# Patient Record
Sex: FEMALE | Race: WHITE | NOT HISPANIC OR LATINO | Employment: UNEMPLOYED | URBAN - METROPOLITAN AREA
[De-identification: names, ages, dates, MRNs, and addresses within clinical notes are randomized per-mention and may not be internally consistent; named-entity substitution may affect disease eponyms.]

---

## 2020-03-10 LAB
ABO GROUP BLD: NORMAL
HBV SURFACE AG SERPL QL IA: NEGATIVE
HIV 1+2 AB+HIV1 P24 AG SERPL QL IA: NORMAL
Lab: POSITIVE
RH BLD: POSITIVE
RUBV IGG SERPL IA-ACNC: NORMAL
TREPONEMA PALLIDUM IGG+IGM AB [PRESENCE] IN SERUM OR PLASMA BY IMMUNOASSAY: NORMAL

## 2020-05-21 ENCOUNTER — APPOINTMENT (OUTPATIENT)
Dept: RADIOLOGY | Facility: MEDICAL CENTER | Age: 20
End: 2020-05-21
Attending: EMERGENCY MEDICINE
Payer: MEDICAID

## 2020-05-21 ENCOUNTER — HOSPITAL ENCOUNTER (EMERGENCY)
Facility: MEDICAL CENTER | Age: 20
End: 2020-05-22
Attending: EMERGENCY MEDICINE
Payer: MEDICAID

## 2020-05-21 DIAGNOSIS — Z3A.18 18 WEEKS GESTATION OF PREGNANCY: ICD-10-CM

## 2020-05-21 DIAGNOSIS — S39.012A LOW BACK STRAIN, INITIAL ENCOUNTER: ICD-10-CM

## 2020-05-21 PROCEDURE — 700102 HCHG RX REV CODE 250 W/ 637 OVERRIDE(OP): Performed by: EMERGENCY MEDICINE

## 2020-05-21 PROCEDURE — 99283 EMERGENCY DEPT VISIT LOW MDM: CPT

## 2020-05-21 PROCEDURE — A9270 NON-COVERED ITEM OR SERVICE: HCPCS | Performed by: EMERGENCY MEDICINE

## 2020-05-21 RX ORDER — DIAZEPAM 5 MG/1
5 TABLET ORAL ONCE
Status: COMPLETED | OUTPATIENT
Start: 2020-05-21 | End: 2020-05-21

## 2020-05-21 RX ORDER — ACETAMINOPHEN 500 MG
1000 TABLET ORAL ONCE
Status: COMPLETED | OUTPATIENT
Start: 2020-05-21 | End: 2020-05-21

## 2020-05-21 RX ADMIN — DIAZEPAM 5 MG: 5 TABLET ORAL at 22:32

## 2020-05-21 RX ADMIN — ACETAMINOPHEN 1000 MG: 500 TABLET ORAL at 22:32

## 2020-05-22 VITALS
WEIGHT: 189.6 LBS | TEMPERATURE: 98.7 F | BODY MASS INDEX: 32.37 KG/M2 | HEART RATE: 84 BPM | OXYGEN SATURATION: 98 % | RESPIRATION RATE: 18 BRPM | DIASTOLIC BLOOD PRESSURE: 60 MMHG | SYSTOLIC BLOOD PRESSURE: 99 MMHG | HEIGHT: 64 IN

## 2020-05-22 PROCEDURE — 72148 MRI LUMBAR SPINE W/O DYE: CPT

## 2020-05-22 NOTE — DISCHARGE INSTRUCTIONS
DO not take NSAIDS for your back pain, but tylenol will be helpful  Return for any new or worsening issues

## 2020-05-22 NOTE — ED PROVIDER NOTES
ED Provider Note    CHIEF COMPLAINT  Chief Complaint   Patient presents with   • Pregnancy     18 week pregnany,    • Low Back Pain     Reaching motion while sitting resulting in shooting back pain less than one hour ago, experinces weakness in legs during even       HPI  Marlene Mejia is a 19 y.o. female who presents to the emergency department chief complaint of acute onset low back pain radiating down to both legs.  Patient is approximately 18 weeks pregnant she is a G1, P0 she follows up with Dr. Ruiz with OB/GYN.  She states that she fell heart a few years ago and she has been having low back pain on and off since then but this evening she reached forward while folding some laundry and then sat up and felt acute pain shooting down her back into her legs.  She states her legs were tingling and felt a little weak during that time.  She has not taken anything for the pain she feeling little nauseated due to the pain.  She denies any vaginal bleeding gush of fluid and she still feeling the fetus move slightly.    REVIEW OF SYSTEMS  Positives as above. Pertinent negatives include vomiting fevers chills vaginal bleeding discharge or fluid leakage dysuria hematuria numbness  All other review of systems are negative    PAST MEDICAL HISTORY   has a past medical history of Asthma.    SOCIAL HISTORY  Social History     Tobacco Use   • Smoking status: Never Smoker   Substance and Sexual Activity   • Alcohol use: No   • Drug use: No   • Sexual activity: Not on file       SURGICAL HISTORY  patient denies any surgical history    CURRENT MEDICATIONS  Home Medications     Reviewed by Billy Keane R.N. (Registered Nurse) on 20 at 2202  Med List Status: Partial   Medication Last Dose Status   cimetidine (TAGAMET) 400 MG TABS  Active   diphenhydrAMINE (BENADRYL) 25 MG capsule  Active                ALLERGIES  Allergies   Allergen Reactions   • Pollen Extract        PHYSICAL EXAM  VITAL SIGNS: /92   Pulse (!)  "119   Temp 36.9 °C (98.5 °F)   Resp 18   Ht 1.626 m (5' 4\")   Wt 86 kg (189 lb 9.5 oz)   SpO2 96%   BMI 32.54 kg/m²    Pulse ox interpretation: I interpret this pulse ox as normal.  Constitutional: Alert mildly stressed  HENT: Normocephalic, Atraumatic, MMM  Eyes: PERound. Conjunctiva normal, non-icteric.   Heart: Regular rate and rhythm, no murmurs.    Lungs: Clear to auscultation bilaterally. No resp distress, breath sounds equal  Abdomen: Non-tender, non-distended, normal bowel sounds uterine fundus felt around the area of the umbilicus  Back: Mild midline L-spine and both SI joint tenderness without stepping as well as bilateral paraspinal muscular tenderness no swelling or step-offs, and the patient has a steady even gait though she is hunched over but strength in bilateral lower extremities 5 out of 5 sensation intact light touch distally.  Skin: Warm, Dry, No erythema, No rash.   Neurologic: Alert and oriented, Grossly non-focal.       DIFFERENTIAL DIAGNOSIS AND WORK UP PLAN    This is a 19 y.o. female who presents with cute low back strain however she states that she is having new worsening symptoms is had prior back pain before and states she is having some neurologic symptoms in the legs although she has no red flags such as IV drug use fevers or bowel or bladder issues she is pregnant and complaining of some weakness and tingling in the legs, I suspect this is most likely sciatica with some low back strain and sprain, I had a long discussion about medication management at this time including Tylenol versus opioids or benzos for the muscle relaxant at this time she is opted for single dose of a benzodiazepine to help with muscle relaxant plus oral Tylenol prior to an MRI.  We also consented for the MRI at this time.    Pertinent Lab Findings  Labs Reviewed - No data to display    Radiology  MR-LUMBAR SPINE-W/O    (Results Pending)   Mild disc disease at L5/S1 otherwise unremarkable     The " "radiologist's interpretation of all radiological studies have been reviewed by me.    COURSE & MEDICAL DECISION MAKING  Pertinent Labs & Imaging studies reviewed. (See chart for details)    12:48 AM  Reassessed patient the bedside she is doing better after the Valium as well as the Tylenol resting a bit more comfortably and actually sitting up or pending the prelim results of her MRI but otherwise she is well-appearing and feels comfortable potentially going home.    1:21 AM  At this time I spoke with Dr Leary with radiology her MRI is completely unremarkable beyond some mild disc disease at L5-S1 otherwise completely normal.    1:23 AM  I discussed the patient with her MRI findings we discussed a strain of her back only that she is going to be just fine she needs to Rice Tylenol stretching and follow-up with OB/GYN.  She understands feels comfortable in home    /57   Pulse 85   Temp 36.9 °C (98.5 °F)   Resp 18   Ht 1.626 m (5' 4\")   Wt 86 kg (189 lb 9.5 oz)   SpO2 96%   BMI 32.54 kg/m²       The patient will return for new or worsening symptoms and is stable at the time of discharge.    The patient is referred to a primary physician for blood pressure management, diabetic screening, and for all other preventative health concerns.    DISPOSITION:  Patient will be discharged home in stable condition.    FOLLOW UP:  Angie Ruiz M.D.  [address]  [facility] [id number] [phone]    Schedule an appointment as soon as possible for a visit       [facility], Emergency Dept  [address] [address] [phone]    If symptoms worsen      OUTPATIENT MEDICATIONS:  New Prescriptions    No medications on file           FINAL IMPRESSION  1. Low back strain, initial encounter     2. 18 weeks gestation of pregnancy         Electronically signed by: Megha Baird M.D., 5/21/2020 10:20 PM    This dictation has been created using voice recognition " software and/or scribes. The accuracy of the dictation is limited by the abilities of the software and the expertise of the scribes. I expect there may be some errors of grammar and possibly content. I made every attempt to manually correct the errors within my dictation. However, errors related to voice recognition software and/or scribes may still exist and should be interpreted within the appropriate context.

## 2020-05-22 NOTE — ED TRIAGE NOTES
Chief Complaint   Patient presents with   • Pregnancy     18 week pregnany,    • Low Back Pain     Reaching motion while sitting resulting in shooting back pain less than one hour ago, experinces weakness in legs during even

## 2020-08-28 ENCOUNTER — HOSPITAL ENCOUNTER (OUTPATIENT)
Facility: MEDICAL CENTER | Age: 20
End: 2020-08-28
Attending: OBSTETRICS & GYNECOLOGY | Admitting: OBSTETRICS & GYNECOLOGY
Payer: MEDICAID

## 2020-08-28 VITALS
HEIGHT: 64 IN | TEMPERATURE: 97.8 F | BODY MASS INDEX: 32.44 KG/M2 | SYSTOLIC BLOOD PRESSURE: 123 MMHG | WEIGHT: 190 LBS | DIASTOLIC BLOOD PRESSURE: 78 MMHG | OXYGEN SATURATION: 96 % | HEART RATE: 85 BPM | RESPIRATION RATE: 16 BRPM

## 2020-08-28 LAB
APPEARANCE UR: CLEAR
COLOR UR AUTO: YELLOW
FIBRONECTIN FETAL SPEC QL: NEGATIVE
GLUCOSE UR QL STRIP.AUTO: 100 MG/DL
KETONES UR QL STRIP.AUTO: ABNORMAL MG/DL
LEUKOCYTE ESTERASE UR QL STRIP.AUTO: ABNORMAL
NITRITE UR QL STRIP.AUTO: POSITIVE
PH UR STRIP.AUTO: 7 [PH] (ref 5–8)
PROT UR QL STRIP: ABNORMAL MG/DL
RBC UR QL AUTO: NEGATIVE
SP GR UR STRIP.AUTO: 1.01 (ref 1–1.03)

## 2020-08-28 PROCEDURE — 59025 FETAL NON-STRESS TEST: CPT

## 2020-08-28 PROCEDURE — 700111 HCHG RX REV CODE 636 W/ 250 OVERRIDE (IP): Performed by: OBSTETRICS & GYNECOLOGY

## 2020-08-28 PROCEDURE — 81002 URINALYSIS NONAUTO W/O SCOPE: CPT

## 2020-08-28 PROCEDURE — 82731 ASSAY OF FETAL FIBRONECTIN: CPT

## 2020-08-28 PROCEDURE — 96372 THER/PROPH/DIAG INJ SC/IM: CPT

## 2020-08-28 PROCEDURE — 87086 URINE CULTURE/COLONY COUNT: CPT

## 2020-08-28 RX ORDER — NITROFURANTOIN 25; 75 MG/1; MG/1
100 CAPSULE ORAL 2 TIMES DAILY WITH MEALS
Status: DISCONTINUED | OUTPATIENT
Start: 2020-08-29 | End: 2020-08-29 | Stop reason: HOSPADM

## 2020-08-28 RX ORDER — TERBUTALINE SULFATE 1 MG/ML
INJECTION, SOLUTION SUBCUTANEOUS
Status: DISCONTINUED
Start: 2020-08-28 | End: 2020-08-28

## 2020-08-28 RX ORDER — NITROFURANTOIN 25; 75 MG/1; MG/1
100 CAPSULE ORAL 2 TIMES DAILY
Qty: 14 CAP | Refills: 0 | Status: SHIPPED | OUTPATIENT
Start: 2020-08-28 | End: 2020-09-04

## 2020-08-28 RX ORDER — TERBUTALINE SULFATE 1 MG/ML
0.25 INJECTION, SOLUTION SUBCUTANEOUS ONCE
Status: COMPLETED | OUTPATIENT
Start: 2020-08-28 | End: 2020-08-28

## 2020-08-28 RX ADMIN — TERBUTALINE SULFATE 0.25 MG: 1 INJECTION SUBCUTANEOUS at 21:53

## 2020-08-29 NOTE — PROGRESS NOTES
Patient arrived to unit and placed in LDA 1, RN placed EFM and toco to ensure fetal well being and monitor uterine activity. RN educated patient on need for monitors and patient verbalized understanding. Vital signs taken and within normal limits. Prenatal labs / records reviewed by RN. Patient's chief complaint contractions.  Patient denies leaking or vaginal bleeding, reports positive fetal movement.    Dr. Cotton notified of patient arrival, brief report given,  & para reviewed, bps, fhr, contraction pattern reviewed, MD will be back on unit in a little bit to assess patient.     Dr. Cotton at bedside, discussing plan of care.    FFN collected. Terb order, urine culture ordered. SVE closed.    Dr. Cotton notified FFN negative, d/c order given, Macrobid script written by Dr. Cotton.   RN at bedside, discharge instructions given to patient and significant other, all questions answered. Patient discharged in stable condition, with significant other,  given labor precautions. Patient ambulated to private car with significant other. Macrobid prescript given and discharge instructions given as follows:   General Instructions:  · If you think you are in labor, time contractions (lying on your left side) from the beginning of one contraction to the beginning of the next contraction for at least one hour.  · Increase fluid intake: you should consume 10-12 8 oz glasses of non-caffeinated fluid per day.  · Report any pressure or burning on urination to your physician.  · Monitor fetal movement: If you notice an absence or decrease in fetal movement, drink a large glass of water and rest on your side.  If there is no increase in movement, call your physician or go to the hospital for further evaluation.  · Report any sudden, sharp abdominal pain.  · Report any bleeding.  Spotting or pinkish discharge is normal after vaginal exam.  You may also spot after sexual intercourse.    Pre-term Labor (<37  weeks):  Call your physician or return to the hospital if:  · You have painless regular contractions more than 4 in one hour.  · Your water breaks (remember time and color).  · You have menstrual-like cramps, a low dull backache or pressure in your pelvis or back.  · Your baby does not move enough to complete the daily kick count (10 movements in 2 hours).  · Your baby moves much less often than on the days before or you have not felt your baby move all day.  · Please review the MEDICATION LIST section of your AFTER VISIT SUMMARY document.  · Take your medication as prescribed    All questions answered and patient knows to keep her scheduled appointment with her OB.

## 2020-08-29 NOTE — CONSULTS
DATE OF SERVICE:  2020    HISTORY OF PRESENT ILLNESS:  Patient is a private patient of Dr. Angie Ruiz's, 19-year-old  1, para 0 at 32 and 5/7th weeks' gestation,   presents to labor and delivery complaining of contractions.  She is afebrile.    Vitals are stable.  She was placed on fetal nonstress test and found in fact   to be joel frequently.  Fetal nonstress test is category 1, reactive,   reassuring, good variability.  Her prenatal course has been complicated by   history of herpes simplex 2 outbreaks as well as a Molluscum contagiosum   outbreak.  She has otherwise had an uneventful prenatal course other than   that.  Fetal fibronectin was checked, it was returned negative.  Her cervix   was checked, she was found to be closed, thick and high.  Her urinalysis did   reveal positive leukocyte esterase and nitrites and trace ketones and trace   protein.  It will be sent for culture.  She was given 1 dose of terbutaline,   which did quiesce her contractions.    ASSESSMENT AND PLAN:  A 19-year-old female  1, para 0 at 32 and 5/7th   weeks' gestation.  1.   contractions, no evidence of  labor.  Fetal fibronectin was   negative.  2.  Urinary tract infection.  We will start her on Macrobid 100 mg 1 p.o.   b.i.d. x7 days.  Urine culture will be sent.  She can follow that up in the   office with Dr. Ruiz later this week.  Encouraged increasing fluids and   hydration.  3.  Molluscum contagiosum outbreak.  Patient has an appointment with Dr. Ruiz later this week to further discuss treatment for it.  4.  History of herpes simplex 2, but no evidence of any active herpes   outbreaks.       ____________________________________     MD FERN HADDAD / ZOYA    DD:  2020 23:25:30  DT:  2020 23:50:25    D#:  [id number]  Job#:  [id number]

## 2020-08-31 LAB
BACTERIA UR CULT: NORMAL
SIGNIFICANT IND 70042: NORMAL
SITE SITE: NORMAL
SOURCE SOURCE: NORMAL

## 2020-09-16 ENCOUNTER — HOSPITAL ENCOUNTER (OUTPATIENT)
Facility: MEDICAL CENTER | Age: 20
End: 2020-09-16
Attending: OBSTETRICS & GYNECOLOGY | Admitting: OBSTETRICS & GYNECOLOGY
Payer: MEDICAID

## 2020-09-16 VITALS
DIASTOLIC BLOOD PRESSURE: 67 MMHG | HEART RATE: 85 BPM | SYSTOLIC BLOOD PRESSURE: 116 MMHG | TEMPERATURE: 98.7 F | RESPIRATION RATE: 18 BRPM | OXYGEN SATURATION: 97 %

## 2020-09-16 LAB
ALBUMIN SERPL BCP-MCNC: 3.3 G/DL (ref 3.2–4.9)
ALBUMIN/GLOB SERPL: 1.2 G/DL
ALP SERPL-CCNC: 121 U/L (ref 30–99)
ALT SERPL-CCNC: 8 U/L (ref 2–50)
ANION GAP SERPL CALC-SCNC: 14 MMOL/L (ref 7–16)
APPEARANCE UR: CLEAR
AST SERPL-CCNC: 8 U/L (ref 12–45)
BASOPHILS # BLD AUTO: 0.4 % (ref 0–1.8)
BASOPHILS # BLD: 0.03 K/UL (ref 0–0.12)
BILIRUB SERPL-MCNC: 0.2 MG/DL (ref 0.1–1.5)
BUN SERPL-MCNC: 3 MG/DL (ref 8–22)
CALCIUM SERPL-MCNC: 8.8 MG/DL (ref 8.5–10.5)
CHLORIDE SERPL-SCNC: 103 MMOL/L (ref 96–112)
CO2 SERPL-SCNC: 21 MMOL/L (ref 20–33)
COLOR UR AUTO: YELLOW
CREAT SERPL-MCNC: 0.35 MG/DL (ref 0.5–1.4)
EOSINOPHIL # BLD AUTO: 0.14 K/UL (ref 0–0.51)
EOSINOPHIL NFR BLD: 1.6 % (ref 0–6.9)
ERYTHROCYTE [DISTWIDTH] IN BLOOD BY AUTOMATED COUNT: 42.5 FL (ref 35.9–50)
GLOBULIN SER CALC-MCNC: 2.8 G/DL (ref 1.9–3.5)
GLUCOSE SERPL-MCNC: 77 MG/DL (ref 65–99)
GLUCOSE UR QL STRIP.AUTO: NEGATIVE MG/DL
HCT VFR BLD AUTO: 35.3 % (ref 37–47)
HGB BLD-MCNC: 11.6 G/DL (ref 12–16)
IMM GRANULOCYTES # BLD AUTO: 0.04 K/UL (ref 0–0.11)
IMM GRANULOCYTES NFR BLD AUTO: 0.5 % (ref 0–0.9)
KETONES UR QL STRIP.AUTO: NEGATIVE MG/DL
LEUKOCYTE ESTERASE UR QL STRIP.AUTO: NEGATIVE
LYMPHOCYTES # BLD AUTO: 1.95 K/UL (ref 1–4.8)
LYMPHOCYTES NFR BLD: 22.9 % (ref 22–41)
MCH RBC QN AUTO: 29.3 PG (ref 27–33)
MCHC RBC AUTO-ENTMCNC: 32.9 G/DL (ref 33.6–35)
MCV RBC AUTO: 89.1 FL (ref 81.4–97.8)
MONOCYTES # BLD AUTO: 0.59 K/UL (ref 0–0.85)
MONOCYTES NFR BLD AUTO: 6.9 % (ref 0–13.4)
NEUTROPHILS # BLD AUTO: 5.78 K/UL (ref 2–7.15)
NEUTROPHILS NFR BLD: 67.7 % (ref 44–72)
NITRITE UR QL STRIP.AUTO: NEGATIVE
NRBC # BLD AUTO: 0 K/UL
NRBC BLD-RTO: 0 /100 WBC
PH UR STRIP.AUTO: 7 [PH] (ref 5–8)
PLATELET # BLD AUTO: 249 K/UL (ref 164–446)
PMV BLD AUTO: 9.5 FL (ref 9–12.9)
POTASSIUM SERPL-SCNC: 3.7 MMOL/L (ref 3.6–5.5)
PROT SERPL-MCNC: 6.1 G/DL (ref 6–8.2)
PROT UR QL STRIP: NEGATIVE MG/DL
RBC # BLD AUTO: 3.96 M/UL (ref 4.2–5.4)
RBC UR QL AUTO: NEGATIVE
SODIUM SERPL-SCNC: 138 MMOL/L (ref 135–145)
SP GR UR STRIP.AUTO: 1.01 (ref 1–1.03)
WBC # BLD AUTO: 8.5 K/UL (ref 4.8–10.8)

## 2020-09-16 PROCEDURE — 83789 MASS SPECTROMETRY QUAL/QUAN: CPT

## 2020-09-16 PROCEDURE — 80053 COMPREHEN METABOLIC PANEL: CPT

## 2020-09-16 PROCEDURE — 59025 FETAL NON-STRESS TEST: CPT

## 2020-09-16 PROCEDURE — 36415 COLL VENOUS BLD VENIPUNCTURE: CPT

## 2020-09-16 PROCEDURE — 85025 COMPLETE CBC W/AUTO DIFF WBC: CPT

## 2020-09-16 PROCEDURE — 81002 URINALYSIS NONAUTO W/O SCOPE: CPT

## 2020-09-16 NOTE — PROGRESS NOTES
PT arrived with CO itching all over her body, she stated her friend had cholestasis and she is concerned that she has it as well.  She also CO N/V due to heartburn. She has generalized discomforts of pregnancy.     Dr Cotton in department, okay to orders labs and offer GI Cocktail    1530 RN at , PT appears generally uncomfortable. She stated she can eat and drink sometimes.  I offered GI cocktail but PT refused.     1900 Report to Lindsey.

## 2020-09-17 NOTE — PROGRESS NOTES
1900 Report received from ALMA ROSA Jasmine RN.     1945 Dr. Cotton updated on labs. Discharge orders received.     2005 Discharge instructions given. Pt states understanding and denies questions. Discharged home.

## 2020-09-17 NOTE — DISCHARGE INSTRUCTIONS
General Instructions:  · If you think you are in labor, time contractions (lying on your left side) from the beginning of one contraction to the beginning of the next contraction for at least one hour.  · Increase fluid intake: you should consume 10-12 8 oz glasses of non-caffeinated fluid per day.  · Report any pressure or burning on urination to your physician.  · Monitor fetal movement: If you notice an absence or decrease in fetal movement, drink a large glass of water and rest on your side.  If there is no increase in movement, call your physician or go to the hospital for further evaluation.  · Report any sudden, sharp abdominal pain.  · Report any bleeding.  Spotting or pinkish discharge is normal after vaginal exam.  You may also spot after sexual intercourse.    Pre-term Labor (<37 weeks):  Call your physician or return to the hospital if:  · You have painless regular contractions more than 4 in one hour.  · Your water breaks (remember time and color).  · You have menstrual-like cramps, a low dull backache or pressure in your pelvis or back.  · Your baby does not move enough to complete the daily kick count (10 movements in 2 hours).  · Your baby moves much less often than on the days before or you have not felt your baby move all day.  · Please review the MEDICATION LIST section of your AFTER VISIT SUMMARY document.  · Take your medication as prescribed      Other Instructions:  Take Benadryl or other allergy medicine such as Allegra as needed for itching.  Follow up with your doctor regarding Bile Acid labs, which may take up to a week to result.

## 2020-09-17 NOTE — CONSULTS
DATE OF SERVICE:  2020    HISTORY OF PRESENT ILLNESS:  Patient is a 19-year-old female  1, para 0   at 35 and 3/7 weeks' gestation, presents to labor and delivery complaining of   itching all over her body.  She also states she has nausea, vomiting due to   heartburn and the generalized discomforts of pregnancy.  She stated she had a   friend who had cholestasis of pregnancy and she was concerned she had it as   well.  She reported good fetal movement, no leaking fluid, no vaginal   bleeding.  Fetal heart rate tracing was overall reassuring.  She denied any   rash and had not taken anything for her itching.    PHYSICAL EXAMINATION:  VITAL SIGNS:  Stable.  She is afebrile.  GENERAL:  Awake, alert, oriented.  She is in no acute distress.  CARDIOVASCULAR:  Regular rate and rhythm.  CHEST:  Clear to auscultation bilaterally.  ABDOMEN:  Gravid, nontender, nondistended.  Normal bowel sounds.  EXTREMITIES:  No cyanosis, clubbing or edema.  PELVIC:  Sterile vaginal exam was deferred.  Fetal heart rate tracing category   1, reactive, reassuring, good variability, no decelerations, no contractions.    LABORATORY DATA:  Labs were drawn.  Hemoglobin 11.6, hematocrit 35.3, platelet   count 249,000.  Chemistry panel was all essentially normal, normal   creatinine, normal liver function testing.  No protein on her urine dipstick   and bile acids are pending.    ASSESSMENT AND PLAN:  A 19-year-old female  1, para 0 at 35 and 3/7   weeks' gestation.  1.  At this point in time, no evidence of cholestasis despite her itching.    Bile acids have been sent in our send out lab and will be back next week.    Patient is aware of this and encouraged her to make sure her OB/GYN follows up   on them and the results next week.  2.  For her itching, she had not taken anything at all.  She may use Benadryl   or Claritin or Zyrtec as an antihistamine.  She also can do calamine lotion or   anti-itch lotion or spray if she needs  it.  3.  Acid reflux.  Patient was offered something stronger for her acid reflux   other than Tums that she is taking.  She reports it is causing nausea and   vomiting; however, she had no nausea, vomiting for us here today and she   declined anything further for her acid reflux.  Patient will follow up with   her doctor as scheduled.  Kick counts, labor warnings given.       ____________________________________     MD FERN HADDAD / ZOYA    DD:  09/16/2020 19:56:23  DT:  09/16/2020 23:37:19    D#:  [id number]  Job#:  [id number]

## 2020-09-18 ENCOUNTER — HOSPITAL ENCOUNTER (OUTPATIENT)
Facility: MEDICAL CENTER | Age: 20
End: 2020-09-18
Attending: OBSTETRICS & GYNECOLOGY | Admitting: OBSTETRICS & GYNECOLOGY
Payer: MEDICAID

## 2020-09-18 VITALS
HEIGHT: 64 IN | WEIGHT: 213 LBS | DIASTOLIC BLOOD PRESSURE: 82 MMHG | TEMPERATURE: 98 F | HEART RATE: 106 BPM | BODY MASS INDEX: 36.37 KG/M2 | SYSTOLIC BLOOD PRESSURE: 120 MMHG

## 2020-09-18 VITALS
RESPIRATION RATE: 16 BRPM | TEMPERATURE: 98.4 F | HEIGHT: 64 IN | DIASTOLIC BLOOD PRESSURE: 73 MMHG | WEIGHT: 213 LBS | OXYGEN SATURATION: 97 % | SYSTOLIC BLOOD PRESSURE: 116 MMHG | BODY MASS INDEX: 36.37 KG/M2 | HEART RATE: 107 BPM

## 2020-09-18 LAB
APPEARANCE UR: ABNORMAL
COLOR UR AUTO: ABNORMAL
GLUCOSE UR QL STRIP.AUTO: NEGATIVE MG/DL
KETONES UR QL STRIP.AUTO: NEGATIVE MG/DL
LEUKOCYTE ESTERASE UR QL STRIP.AUTO: NEGATIVE
NITRITE UR QL STRIP.AUTO: NEGATIVE
PH UR STRIP.AUTO: 6.5 [PH] (ref 5–8)
PROT UR QL STRIP: NEGATIVE MG/DL
RBC UR QL AUTO: ABNORMAL
SP GR UR STRIP.AUTO: 1.01 (ref 1–1.03)

## 2020-09-18 PROCEDURE — 87150 DNA/RNA AMPLIFIED PROBE: CPT

## 2020-09-18 PROCEDURE — 87081 CULTURE SCREEN ONLY: CPT

## 2020-09-18 PROCEDURE — 59025 FETAL NON-STRESS TEST: CPT

## 2020-09-18 PROCEDURE — 700111 HCHG RX REV CODE 636 W/ 250 OVERRIDE (IP): Performed by: OBSTETRICS & GYNECOLOGY

## 2020-09-18 PROCEDURE — 96372 THER/PROPH/DIAG INJ SC/IM: CPT

## 2020-09-18 PROCEDURE — 81002 URINALYSIS NONAUTO W/O SCOPE: CPT

## 2020-09-18 RX ORDER — MORPHINE SULFATE 4 MG/ML
5 INJECTION, SOLUTION INTRAMUSCULAR; INTRAVENOUS ONCE
Status: COMPLETED | OUTPATIENT
Start: 2020-09-18 | End: 2020-09-18

## 2020-09-18 RX ADMIN — MORPHINE SULFATE 5 MG: 4 INJECTION INTRAVENOUS at 15:41

## 2020-09-18 ASSESSMENT — FIBROSIS 4 INDEX
FIB4 SCORE: 0.24
FIB4 SCORE: 0.24

## 2020-09-18 NOTE — PROGRESS NOTES
"Progress Note    Subjective: 19-year-old G1 at 35 weeks and 5/7 days here today complaining of contractions that started last evening.  They have been progressively worsening throughout the day.  Patient reports no loss of fluid no vaginal bleeding good fetal movement.  Review of systems negative for fever nausea vomiting chest pain shortness of breath last taste or smell    Objective Data:  Recent Labs     20  1653   WBC 8.5   RBC 3.96*   HEMOGLOBIN 11.6*   HEMATOCRIT 35.3*   MCV 89.1   MCH 29.3   MCHC 32.9*   RDW 42.5   PLATELETCT 249   MPV 9.5     Recent Labs     20  1653   SODIUM 138   POTASSIUM 3.7   CHLORIDE 103   CO2 21   GLUCOSE 77   BUN 3*   CREATININE 0.35*   CALCIUM 8.8       Vitals:    20 1224   BP: 120/82   Pulse: (!) 106   Temp: 36.7 °C (98 °F)   TempSrc: Temporal   Weight: 96.6 kg (213 lb)   Height: 1.626 m (5' 4.02\")       Well-developed well-nourished female in moderate distress  Cervix was examined by nurse approximately 2 hours ago, 1 thick and high  Cervical exam now by myself unchanged    NST read by me fetal heart tones 140s reactive no decelerations  [place] shows irregular contractions    Current Facility-Administered Medications   Medication Dose Route Frequency Provider Last Rate Last Dose   • morphine (pf) 4 MG/ML injection 5 mg  5 mg Intramuscular Once Angie Ruiz M.D.           Assessment: IUP 35-5/7 weeks with  contractions but no evidence of  labor    Plan: IM morphine for therapeutic rest  GBS collected  "

## 2020-09-18 NOTE — PROGRESS NOTES
EDC  10/18/2020      EGA  35w5d    1221: TOCO/US applied, pt educated. Pt presents with UC's stating the contractions come every 5 minutes, and states contractions started last evening. Pt declines LOF, VB, and states +FM. Pt appears to be really uncomfortable, pt c/o of lower back pain and pressure in the vaginal area. Pt declines any complications during this pregnancy, but states she was screened for cholestasis 2 days ago, but is still pending results. POC discussed with patient, all questions and concerns addressed.     1249: SVE /thick/-2    1541: 5 mg IM morphine given to patient for pain, see MAR.     1555: Discharge orders received from Dr. Ruiz. T's tracing reviewed by Dr. Kramer.     1607: Discharge instructions gone over with patient and SO Brandon, all questions and concerns addressed. SO Brandon will be driving patient home.

## 2020-09-19 LAB — GP B STREP DNA SPEC QL NAA+PROBE: NEGATIVE

## 2020-09-19 NOTE — PROGRESS NOTES
Pt is a 20 yo  35w5d. Reports to L&D with report of decreased fetal movement. Pt was seen earlier today for abdominal pain/contractions which have since resolved. Pt denies VB, LOF, regular UC. NST reactive. Dr. Ruiz notified of pt arrival and assessment. Discharge orders received.     Discharge instructions given. Questions addressed. Pt discharged in stable condition.

## 2020-09-22 LAB
BILE AC SERPL-SCNC: 1.5 UMOL/L (ref 0–7)
CDCAE SERPL-SCNC: 0.4 UMOL/L (ref 0–3.4)
CHOLATE SERPL-SCNC: 0.3 UMOL/L (ref 0–1.9)
DO-CHOLATE SERPL-SCNC: 0.5 UMOL/L (ref 0–2.5)
URSODEOXYCHOLATE SERPL-SCNC: 0.3 UMOL/L (ref 0–1)

## 2020-10-08 ENCOUNTER — HOSPITAL ENCOUNTER (OUTPATIENT)
Dept: OBGYN | Facility: MEDICAL CENTER | Age: 20
End: 2020-10-08
Attending: OBSTETRICS & GYNECOLOGY
Payer: MEDICAID

## 2020-10-08 LAB
COVID ORDER STATUS COVID19: NORMAL
SARS-COV-2 RNA RESP QL NAA+PROBE: NOTDETECTED
SPECIMEN SOURCE: NORMAL

## 2020-10-08 PROCEDURE — U0003 INFECTIOUS AGENT DETECTION BY NUCLEIC ACID (DNA OR RNA); SEVERE ACUTE RESPIRATORY SYNDROME CORONAVIRUS 2 (SARS-COV-2) (CORONAVIRUS DISEASE [COVID-19]), AMPLIFIED PROBE TECHNIQUE, MAKING USE OF HIGH THROUGHPUT TECHNOLOGIES AS DESCRIBED BY CMS-2020-01-R: HCPCS

## 2020-10-08 PROCEDURE — C9803 HOPD COVID-19 SPEC COLLECT: HCPCS | Performed by: OBSTETRICS & GYNECOLOGY

## 2020-10-10 NOTE — H&P
DATE OF ADMISSION:  10/11/2020    HISTORY OF PRESENT ILLNESS:  This is a 20-year-old  whose estimated date   of delivery is 10/18 making her 39 weeks based on a first trimester   ultrasound.  The patient desires elective induction of labor.  She has been   having significant uncomfortable prodromal labor and contractions for the last   few weeks.  She also reports having itching, although she has been ruled out   for cholestasis of pregnancy 2 times.  She reports good fetal movement,   reports contractions.  No vaginal bleeding, no loss of fluid.    REVIEW OF SYSTEMS:  Negative for chest pain, shortness of breath, fever,   nausea, vomiting, cough, or loss of taste and smell.    MEDICAL HISTORY:  Asthma.    SURGICAL HISTORY:  None.    MEDICATIONS:  Patient is currently taking Aldara 5% cream for the last 4   weeks.    ALLERGIES:  No known drug allergies.    SOCIAL HISTORY:  She denies tobacco, ethanol or drugs.    PHYSICAL EXAMINATION:  GENERAL:  She is a well-developed, well-nourished female, in no apparent   distress.  VITAL SIGNS:  Blood pressure 104/72.  HEART:  Regular rate and rhythm.  LUNGS:  Clear to auscultation bilaterally.  ABDOMEN:  Soft and gravid.  EXTREMITIES:  Show trace edema.  CERVICAL EXAM:  1, 50%, -2 station, vertex presentation.    PERTINENT LABORATORY DATA:  Her blood type is A positive, antibody screen   negative.  Genetic testing first trimester sequential screen was negative, HIV   negative, RPR nonreactive, rubella immune, group B strep is negative.    ASSESSMENT AND PLAN:  A 20-year-old  1, para 0 at 39 weeks with desired   elective induction of labor.  Patient is counseled on risks and benefits.  He   will be admitted on 10/11 at 8:00 a.m. for induction.       ____________________________________     MD ALFRED Merchant / ZOYA    DD:  10/09/2020 17:13:43  DT:  10/09/2020 20:28:29    D#:  [id number]  Job#:  [id number]

## 2020-10-11 ENCOUNTER — APPOINTMENT (OUTPATIENT)
Dept: OBGYN | Facility: MEDICAL CENTER | Age: 20
End: 2020-10-11
Attending: OBSTETRICS & GYNECOLOGY
Payer: MEDICAID

## 2020-10-11 ENCOUNTER — HOSPITAL ENCOUNTER (INPATIENT)
Facility: MEDICAL CENTER | Age: 20
LOS: 3 days | End: 2020-10-14
Attending: OBSTETRICS & GYNECOLOGY | Admitting: OBSTETRICS & GYNECOLOGY
Payer: MEDICAID

## 2020-10-11 LAB
BASOPHILS # BLD AUTO: 0.3 % (ref 0–1.8)
BASOPHILS # BLD: 0.02 K/UL (ref 0–0.12)
EOSINOPHIL # BLD AUTO: 0.15 K/UL (ref 0–0.51)
EOSINOPHIL NFR BLD: 1.9 % (ref 0–6.9)
ERYTHROCYTE [DISTWIDTH] IN BLOOD BY AUTOMATED COUNT: 42.3 FL (ref 35.9–50)
HCT VFR BLD AUTO: 36.6 % (ref 37–47)
HGB BLD-MCNC: 12.3 G/DL (ref 12–16)
HOLDING TUBE BB 8507: NORMAL
IMM GRANULOCYTES # BLD AUTO: 0.03 K/UL (ref 0–0.11)
IMM GRANULOCYTES NFR BLD AUTO: 0.4 % (ref 0–0.9)
LYMPHOCYTES # BLD AUTO: 2.04 K/UL (ref 1–4.8)
LYMPHOCYTES NFR BLD: 26.1 % (ref 22–41)
MCH RBC QN AUTO: 29.1 PG (ref 27–33)
MCHC RBC AUTO-ENTMCNC: 33.6 G/DL (ref 33.6–35)
MCV RBC AUTO: 86.5 FL (ref 81.4–97.8)
MONOCYTES # BLD AUTO: 0.65 K/UL (ref 0–0.85)
MONOCYTES NFR BLD AUTO: 8.3 % (ref 0–13.4)
NEUTROPHILS # BLD AUTO: 4.92 K/UL (ref 2–7.15)
NEUTROPHILS NFR BLD: 63 % (ref 44–72)
NRBC # BLD AUTO: 0 K/UL
NRBC BLD-RTO: 0 /100 WBC
PLATELET # BLD AUTO: 285 K/UL (ref 164–446)
PMV BLD AUTO: 9.9 FL (ref 9–12.9)
RBC # BLD AUTO: 4.23 M/UL (ref 4.2–5.4)
WBC # BLD AUTO: 7.8 K/UL (ref 4.8–10.8)

## 2020-10-11 PROCEDURE — 700102 HCHG RX REV CODE 250 W/ 637 OVERRIDE(OP): Performed by: OBSTETRICS & GYNECOLOGY

## 2020-10-11 PROCEDURE — 770002 HCHG ROOM/CARE - OB PRIVATE (112)

## 2020-10-11 PROCEDURE — 36415 COLL VENOUS BLD VENIPUNCTURE: CPT

## 2020-10-11 PROCEDURE — A9270 NON-COVERED ITEM OR SERVICE: HCPCS | Performed by: OBSTETRICS & GYNECOLOGY

## 2020-10-11 PROCEDURE — 85025 COMPLETE CBC W/AUTO DIFF WBC: CPT

## 2020-10-11 RX ORDER — SODIUM CHLORIDE, SODIUM LACTATE, POTASSIUM CHLORIDE, CALCIUM CHLORIDE 600; 310; 30; 20 MG/100ML; MG/100ML; MG/100ML; MG/100ML
INJECTION, SOLUTION INTRAVENOUS CONTINUOUS
Status: ACTIVE | OUTPATIENT
Start: 2020-10-11 | End: 2020-10-11

## 2020-10-11 RX ORDER — MISOPROSTOL 200 UG/1
800 TABLET ORAL
Status: COMPLETED | OUTPATIENT
Start: 2020-10-11 | End: 2020-10-12

## 2020-10-11 RX ORDER — METHYLERGONOVINE MALEATE 0.2 MG/ML
0.2 INJECTION INTRAVENOUS
Status: COMPLETED | OUTPATIENT
Start: 2020-10-11 | End: 2020-10-12

## 2020-10-11 RX ADMIN — MISOPROSTOL 25 MCG: 100 TABLET ORAL at 15:35

## 2020-10-11 SDOH — ECONOMIC STABILITY: TRANSPORTATION INSECURITY
IN THE PAST 12 MONTHS, HAS LACK OF TRANSPORTATION KEPT YOU FROM MEETINGS, WORK, OR FROM GETTING THINGS NEEDED FOR DAILY LIVING?: PATIENT DECLINED

## 2020-10-11 SDOH — ECONOMIC STABILITY: FOOD INSECURITY: WITHIN THE PAST 12 MONTHS, YOU WORRIED THAT YOUR FOOD WOULD RUN OUT BEFORE YOU GOT MONEY TO BUY MORE.: PATIENT DECLINED

## 2020-10-11 SDOH — ECONOMIC STABILITY: TRANSPORTATION INSECURITY
IN THE PAST 12 MONTHS, HAS THE LACK OF TRANSPORTATION KEPT YOU FROM MEDICAL APPOINTMENTS OR FROM GETTING MEDICATIONS?: PATIENT DECLINED

## 2020-10-11 SDOH — ECONOMIC STABILITY: FOOD INSECURITY: WITHIN THE PAST 12 MONTHS, THE FOOD YOU BOUGHT JUST DIDN'T LAST AND YOU DIDN'T HAVE MONEY TO GET MORE.: PATIENT DECLINED

## 2020-10-11 ASSESSMENT — LIFESTYLE VARIABLES
EVER_SMOKED: NEVER
ALCOHOL_USE: NO

## 2020-10-11 ASSESSMENT — PATIENT HEALTH QUESTIONNAIRE - PHQ9
2. FEELING DOWN, DEPRESSED, IRRITABLE, OR HOPELESS: NOT AT ALL
SUM OF ALL RESPONSES TO PHQ9 QUESTIONS 1 AND 2: 0
1. LITTLE INTEREST OR PLEASURE IN DOING THINGS: NOT AT ALL
1. LITTLE INTEREST OR PLEASURE IN DOING THINGS: NOT AT ALL
2. FEELING DOWN, DEPRESSED, IRRITABLE, OR HOPELESS: NOT AT ALL
SUM OF ALL RESPONSES TO PHQ9 QUESTIONS 1 AND 2: 0

## 2020-10-11 ASSESSMENT — FIBROSIS 4 INDEX: FIB4 SCORE: 0.25

## 2020-10-11 NOTE — CARE PLAN
Problem: Pain  Goal: Alleviation of Pain or a reduction in pain to the patient's comfort goal  Outcome: PROGRESSING AS EXPECTED  Note: Discussed all pain management options with pt, desires an epidural when in active labor.     Problem: Risk for Infection, Impaired Wound Healing  Goal: Remain free from signs and symptoms of infection  Outcome: PROGRESSING AS EXPECTED  Note: Pt remains free from signs/symptoms of infection.

## 2020-10-11 NOTE — PROGRESS NOTES
EDC- 10/18, EGA- 39    1445- Pt arrived for scheduled elective IOL with FOB Luigi at side.  Pt denies UC's, LOF, or VB, reports +FM.  EFM/TOCO applied, VSS.  SVE- 1.5/50/-2.  IV started, labs drawn, POC discussed, all questions answered.  1515- Dr. Reyna at bs for bright light exam.  No active lesions per MD, POC for cytotec placement.  1535- Cytotec placed per MD orders.  190- Report to ZOILA Dockery RN.

## 2020-10-12 ENCOUNTER — ANESTHESIA EVENT (OUTPATIENT)
Dept: ANESTHESIOLOGY | Facility: MEDICAL CENTER | Age: 20
End: 2020-10-12
Payer: MEDICAID

## 2020-10-12 ENCOUNTER — ANESTHESIA (OUTPATIENT)
Dept: ANESTHESIOLOGY | Facility: MEDICAL CENTER | Age: 20
End: 2020-10-12
Payer: MEDICAID

## 2020-10-12 PROCEDURE — 303615 HCHG EPIDURAL/SPINAL ANESTHESIA FOR LABOR

## 2020-10-12 PROCEDURE — 700111 HCHG RX REV CODE 636 W/ 250 OVERRIDE (IP): Performed by: OBSTETRICS & GYNECOLOGY

## 2020-10-12 PROCEDURE — 59409 OBSTETRICAL CARE: CPT

## 2020-10-12 PROCEDURE — 304965 HCHG RECOVERY SERVICES

## 2020-10-12 PROCEDURE — 700101 HCHG RX REV CODE 250

## 2020-10-12 PROCEDURE — 770002 HCHG ROOM/CARE - OB PRIVATE (112)

## 2020-10-12 PROCEDURE — 3E0E77Z INTRODUCTION OF ELECTROLYTIC AND WATER BALANCE SUBSTANCE INTO PRODUCTS OF CONCEPTION, VIA NATURAL OR ARTIFICIAL OPENING: ICD-10-PCS | Performed by: OBSTETRICS & GYNECOLOGY

## 2020-10-12 PROCEDURE — 700101 HCHG RX REV CODE 250: Performed by: ANESTHESIOLOGY

## 2020-10-12 PROCEDURE — 700102 HCHG RX REV CODE 250 W/ 637 OVERRIDE(OP): Performed by: OBSTETRICS & GYNECOLOGY

## 2020-10-12 PROCEDURE — 4A1H74Z MONITORING OF PRODUCTS OF CONCEPTION, CARDIAC ELECTRICAL ACTIVITY, VIA NATURAL OR ARTIFICIAL OPENING: ICD-10-PCS | Performed by: OBSTETRICS & GYNECOLOGY

## 2020-10-12 PROCEDURE — 700111 HCHG RX REV CODE 636 W/ 250 OVERRIDE (IP)

## 2020-10-12 PROCEDURE — 10H073Z INSERTION OF MONITORING ELECTRODE INTO PRODUCTS OF CONCEPTION, VIA NATURAL OR ARTIFICIAL OPENING: ICD-10-PCS | Performed by: OBSTETRICS & GYNECOLOGY

## 2020-10-12 PROCEDURE — 700105 HCHG RX REV CODE 258: Performed by: OBSTETRICS & GYNECOLOGY

## 2020-10-12 PROCEDURE — 0UQGXZZ REPAIR VAGINA, EXTERNAL APPROACH: ICD-10-PCS | Performed by: OBSTETRICS & GYNECOLOGY

## 2020-10-12 PROCEDURE — 10H07YZ INSERTION OF OTHER DEVICE INTO PRODUCTS OF CONCEPTION, VIA NATURAL OR ARTIFICIAL OPENING: ICD-10-PCS | Performed by: OBSTETRICS & GYNECOLOGY

## 2020-10-12 PROCEDURE — 0UQMXZZ REPAIR VULVA, EXTERNAL APPROACH: ICD-10-PCS | Performed by: OBSTETRICS & GYNECOLOGY

## 2020-10-12 PROCEDURE — 700105 HCHG RX REV CODE 258

## 2020-10-12 PROCEDURE — A9270 NON-COVERED ITEM OR SERVICE: HCPCS | Performed by: OBSTETRICS & GYNECOLOGY

## 2020-10-12 RX ORDER — AZITHROMYCIN 500 MG/5ML
500 INJECTION, POWDER, LYOPHILIZED, FOR SOLUTION INTRAVENOUS ONCE
Status: DISPENSED | OUTPATIENT
Start: 2020-10-12 | End: 2020-10-13

## 2020-10-12 RX ORDER — SODIUM CHLORIDE, SODIUM LACTATE, POTASSIUM CHLORIDE, CALCIUM CHLORIDE 600; 310; 30; 20 MG/100ML; MG/100ML; MG/100ML; MG/100ML
INJECTION, SOLUTION INTRAVENOUS
Status: COMPLETED
Start: 2020-10-12 | End: 2020-10-12

## 2020-10-12 RX ORDER — ROPIVACAINE HYDROCHLORIDE 2 MG/ML
INJECTION, SOLUTION EPIDURAL; INFILTRATION; PERINEURAL CONTINUOUS
Status: DISCONTINUED | OUTPATIENT
Start: 2020-10-12 | End: 2020-10-12 | Stop reason: HOSPADM

## 2020-10-12 RX ORDER — SODIUM CHLORIDE, SODIUM LACTATE, POTASSIUM CHLORIDE, AND CALCIUM CHLORIDE .6; .31; .03; .02 G/100ML; G/100ML; G/100ML; G/100ML
1000 INJECTION, SOLUTION INTRAVENOUS
Status: DISCONTINUED | OUTPATIENT
Start: 2020-10-12 | End: 2020-10-12 | Stop reason: HOSPADM

## 2020-10-12 RX ORDER — MISOPROSTOL 200 UG/1
600 TABLET ORAL
Status: DISCONTINUED | OUTPATIENT
Start: 2020-10-12 | End: 2020-10-14 | Stop reason: HOSPADM

## 2020-10-12 RX ORDER — SODIUM CHLORIDE, SODIUM LACTATE, POTASSIUM CHLORIDE, CALCIUM CHLORIDE 600; 310; 30; 20 MG/100ML; MG/100ML; MG/100ML; MG/100ML
INJECTION, SOLUTION INTRAVENOUS PRN
Status: DISCONTINUED | OUTPATIENT
Start: 2020-10-12 | End: 2020-10-14 | Stop reason: HOSPADM

## 2020-10-12 RX ORDER — ONDANSETRON 2 MG/ML
4 INJECTION INTRAMUSCULAR; INTRAVENOUS EVERY 6 HOURS PRN
Status: DISCONTINUED | OUTPATIENT
Start: 2020-10-12 | End: 2020-10-14 | Stop reason: HOSPADM

## 2020-10-12 RX ORDER — LIDOCAINE HYDROCHLORIDE 10 MG/ML
INJECTION, SOLUTION INFILTRATION; PERINEURAL
Status: COMPLETED
Start: 2020-10-12 | End: 2020-10-12

## 2020-10-12 RX ORDER — VITAMIN A ACETATE, BETA CAROTENE, ASCORBIC ACID, CHOLECALCIFEROL, .ALPHA.-TOCOPHEROL ACETATE, DL-, THIAMINE MONONITRATE, RIBOFLAVIN, NIACINAMIDE, PYRIDOXINE HYDROCHLORIDE, FOLIC ACID, CYANOCOBALAMIN, CALCIUM CARBONATE, FERROUS FUMARATE, ZINC OXIDE, CUPRIC OXIDE 3080; 12; 120; 400; 1; 1.84; 3; 20; 22; 920; 25; 200; 27; 10; 2 [IU]/1; UG/1; MG/1; [IU]/1; MG/1; MG/1; MG/1; MG/1; MG/1; [IU]/1; MG/1; MG/1; MG/1; MG/1; MG/1
1 TABLET, FILM COATED ORAL EVERY MORNING
Status: DISCONTINUED | OUTPATIENT
Start: 2020-10-13 | End: 2020-10-14 | Stop reason: HOSPADM

## 2020-10-12 RX ORDER — METOCLOPRAMIDE HYDROCHLORIDE 5 MG/ML
10 INJECTION INTRAMUSCULAR; INTRAVENOUS ONCE
Status: CANCELLED | OUTPATIENT
Start: 2020-10-12 | End: 2020-10-12

## 2020-10-12 RX ORDER — SODIUM CHLORIDE, SODIUM GLUCONATE, SODIUM ACETATE, POTASSIUM CHLORIDE AND MAGNESIUM CHLORIDE 526; 502; 368; 37; 30 MG/100ML; MG/100ML; MG/100ML; MG/100ML; MG/100ML
1500 INJECTION, SOLUTION INTRAVENOUS ONCE
Status: CANCELLED | OUTPATIENT
Start: 2020-10-12 | End: 2020-10-12

## 2020-10-12 RX ORDER — SODIUM CHLORIDE, SODIUM LACTATE, POTASSIUM CHLORIDE, CALCIUM CHLORIDE 600; 310; 30; 20 MG/100ML; MG/100ML; MG/100ML; MG/100ML
1000 INJECTION, SOLUTION INTRAVENOUS CONTINUOUS
Status: DISCONTINUED | OUTPATIENT
Start: 2020-10-12 | End: 2020-10-12 | Stop reason: HOSPADM

## 2020-10-12 RX ORDER — CITRIC ACID/SODIUM CITRATE 334-500MG
30 SOLUTION, ORAL ORAL ONCE
Status: CANCELLED | OUTPATIENT
Start: 2020-10-12 | End: 2020-10-12

## 2020-10-12 RX ORDER — SODIUM CHLORIDE, SODIUM LACTATE, POTASSIUM CHLORIDE, AND CALCIUM CHLORIDE .6; .31; .03; .02 G/100ML; G/100ML; G/100ML; G/100ML
250 INJECTION, SOLUTION INTRAVENOUS PRN
Status: DISCONTINUED | OUTPATIENT
Start: 2020-10-12 | End: 2020-10-12 | Stop reason: HOSPADM

## 2020-10-12 RX ORDER — ROPIVACAINE HYDROCHLORIDE 2 MG/ML
INJECTION, SOLUTION EPIDURAL; INFILTRATION; PERINEURAL
Status: COMPLETED
Start: 2020-10-12 | End: 2020-10-12

## 2020-10-12 RX ORDER — OXYCODONE HYDROCHLORIDE AND ACETAMINOPHEN 5; 325 MG/1; MG/1
2 TABLET ORAL EVERY 4 HOURS PRN
Status: DISCONTINUED | OUTPATIENT
Start: 2020-10-12 | End: 2020-10-14 | Stop reason: HOSPADM

## 2020-10-12 RX ORDER — DOCUSATE SODIUM 100 MG/1
100 CAPSULE, LIQUID FILLED ORAL 2 TIMES DAILY PRN
Status: DISCONTINUED | OUTPATIENT
Start: 2020-10-12 | End: 2020-10-14 | Stop reason: HOSPADM

## 2020-10-12 RX ORDER — ONDANSETRON 4 MG/1
4 TABLET, ORALLY DISINTEGRATING ORAL EVERY 6 HOURS PRN
Status: DISCONTINUED | OUTPATIENT
Start: 2020-10-12 | End: 2020-10-14 | Stop reason: HOSPADM

## 2020-10-12 RX ORDER — OXYCODONE HYDROCHLORIDE AND ACETAMINOPHEN 5; 325 MG/1; MG/1
1 TABLET ORAL EVERY 4 HOURS PRN
Status: DISCONTINUED | OUTPATIENT
Start: 2020-10-12 | End: 2020-10-14 | Stop reason: HOSPADM

## 2020-10-12 RX ORDER — LIDOCAINE HYDROCHLORIDE AND EPINEPHRINE 15; 5 MG/ML; UG/ML
INJECTION, SOLUTION EPIDURAL
Status: COMPLETED | OUTPATIENT
Start: 2020-10-12 | End: 2020-10-12

## 2020-10-12 RX ADMIN — LIDOCAINE HYDROCHLORIDE,EPINEPHRINE BITARTRATE 3 ML: 15; .005 INJECTION, SOLUTION EPIDURAL; INFILTRATION; INTRACAUDAL; PERINEURAL at 09:05

## 2020-10-12 RX ADMIN — SODIUM CHLORIDE, POTASSIUM CHLORIDE, SODIUM LACTATE AND CALCIUM CHLORIDE 1000 ML: 600; 310; 30; 20 INJECTION, SOLUTION INTRAVENOUS at 10:19

## 2020-10-12 RX ADMIN — LIDOCAINE HYDROCHLORIDE: 10 INJECTION, SOLUTION INFILTRATION; PERINEURAL at 17:45

## 2020-10-12 RX ADMIN — ROPIVACAINE HYDROCHLORIDE 200 MG: 2 INJECTION, SOLUTION EPIDURAL; INFILTRATION at 09:09

## 2020-10-12 RX ADMIN — SODIUM CHLORIDE, POTASSIUM CHLORIDE, SODIUM LACTATE AND CALCIUM CHLORIDE 1000 ML: 600; 310; 30; 20 INJECTION, SOLUTION INTRAVENOUS at 15:39

## 2020-10-12 RX ADMIN — SODIUM CHLORIDE, POTASSIUM CHLORIDE, SODIUM LACTATE AND CALCIUM CHLORIDE 1000 ML: 600; 310; 30; 20 INJECTION, SOLUTION INTRAVENOUS at 05:23

## 2020-10-12 RX ADMIN — OXYTOCIN 2 MILLI-UNITS/MIN: 10 INJECTION, SOLUTION INTRAMUSCULAR; INTRAVENOUS at 05:24

## 2020-10-12 RX ADMIN — METHYLERGONOVINE MALEATE 0.2 MG: 0.2 INJECTION, SOLUTION INTRAMUSCULAR; INTRAVENOUS at 17:43

## 2020-10-12 RX ADMIN — SODIUM CHLORIDE, POTASSIUM CHLORIDE, SODIUM LACTATE AND CALCIUM CHLORIDE 1000 ML: 600; 310; 30; 20 INJECTION, SOLUTION INTRAVENOUS at 14:20

## 2020-10-12 RX ADMIN — ROPIVACAINE HYDROCHLORIDE 200 MG: 2 INJECTION, SOLUTION EPIDURAL; INFILTRATION; PERINEURAL at 09:09

## 2020-10-12 RX ADMIN — ONDANSETRON 4 MG: 2 INJECTION INTRAMUSCULAR; INTRAVENOUS at 19:11

## 2020-10-12 RX ADMIN — SODIUM CHLORIDE, POTASSIUM CHLORIDE, SODIUM LACTATE AND CALCIUM CHLORIDE: 600; 310; 30; 20 INJECTION, SOLUTION INTRAVENOUS at 08:52

## 2020-10-12 RX ADMIN — MISOPROSTOL 800 MCG: 200 TABLET ORAL at 17:45

## 2020-10-12 RX ADMIN — OXYTOCIN 125 ML/HR: 10 INJECTION, SOLUTION INTRAMUSCULAR; INTRAVENOUS at 18:16

## 2020-10-12 ASSESSMENT — PAIN SCALES - GENERAL: PAIN_LEVEL: 0

## 2020-10-12 ASSESSMENT — PAIN DESCRIPTION - PAIN TYPE
TYPE: ACUTE PAIN
TYPE: ACUTE PAIN

## 2020-10-12 NOTE — PROGRESS NOTES
Progress note    2020    Patient is a 20-year-old female  1 para 0 who presented at 39 weeks for elective induction.  Pregnancy has been complicated by prodromal labor and itching but she has no cholestasis of pregnancy.    She received 1 Cytotec yesterday afternoon she was pretty active up until the evening then opted to try and get some sleep and she was begun on Pitocin this morning    Patient does have a history of type II herpes.  She has no symptoms.  I did a bright light exam yesterday and no lesions were noted.    Subjective: Uncomfortable contractions    Objective:  Cervix: 2 cm, 50% effaced, vertex, amniotic sac ruptured fluid clear.  IUPC placed  Fetal heart tones category 1    Assessment:  1-term intrauterine pregnancy at 39-1/7 weeks  2-induction  3-history of genital herpes    Plan:  Patient on Pitocin, expectant management

## 2020-10-12 NOTE — PROGRESS NOTES
0700- Report received. Care assumed. G1 at 39-1 admitted yesterday for elective IOL. AROM at 0645, clear, and IUPC in place. Pt breathing through ctx. Pt to have a light breakfast. POC discussed.   0852- Pt sitting up for epidural.   0911- Test dose administered without complications.   0945- SVE 3/80/-1  Late entry/Summary Report: Pt started having variable decels, managed with position changes, amnioinfusion, and stopping Pit. FSE placed by MD at 1415. MD remained in room until 1535 for evaluation of decels and assist of position changes. Decels resolved. Pit resumed. Pt dilated to complete. MD in room for pushing. VAVD at 1728 of baby girl, loose nuchal, 7/9 apgars. RT, ICN, Transition RN, and scrub tech present for delivery. Bleeding following delivery and during repair treated with Cytotec, methergine, Pitocin, and straight cath. Fundus remained firm with light bleeding following interventions. 1820- Pt reported some pressure in her chest while skin to skin with baby, then suddenly vomited all over bed and floor. Chest pressure resolved. Pt assisted to bathroom, + void, pads placed, gown and linens changed and assisted back to bed. Bleeding remains light. MD informed. Report to Eliza COLLINS RN.

## 2020-10-12 NOTE — PROGRESS NOTES
MD L&D progress note     S: Pt feeling pelvic pressure    O: VSS afebrile  FHR - 145, pos accels, pt had a run of variable decels with contractions - pitocin d/c'ed, multiple position changes, hands and knees, amnioinfusion running - decelerations now resolved, mod variability, cat 2 FHR  [place] - every 2.5 to 4 minutes  SVE - 8/100/-1, FSE placed     A: IUP at 39+1wks, active labor, hx category 2 FHR - resolved, GBS neg     P: Continue labor management, fetal monitoring/toco, IV fluids, restart pitocin

## 2020-10-12 NOTE — PROGRESS NOTES
1900- report received from Ani PAPPAS RN. Pt resting in bed. Feeling more UCs in back but coping well. Will recheck pt around 1930. POC dicussed. Questions answered. All needs met at this time.   2000- SVE 2/50/-1. Cervix anterior. Pt joel too much for me to place cytotec. Call to Dr. Ortega. Let patient sit for another hour or so and see what she does  2100- Dr. Ortega at bedside. No SVE at this time. POC the same. Let patient labor on own for now   0100- dr. ortega at bedside. Discussion with patient. Will let pt sleep and star pitocin later   0524- SVE no change. Pitocin started See MAR   0645- Dr. ortega at bedside. SVE 2/50/-2. AROM/Clear fluid. IUPC placed

## 2020-10-12 NOTE — ANESTHESIA PROCEDURE NOTES
Epidural Block    Date/Time: 10/12/2020 9:05 AM  Performed by: Micha Sanders M.D.  Authorized by: Micha Sanders M.D.     Patient Location:  OB  Start Time:  10/12/2020 9:05 AM  End Time:  10/12/2020 9:10 AM  Reason for Block: labor analgesia    patient identified, IV checked, site marked, risks and benefits discussed, surgical consent, monitors and equipment checked, pre-op evaluation and timeout performed    Patient Position:  Sitting  Prep: ChloraPrep, patient draped and sterile technique    Monitoring:  Blood pressure, continuous pulse oximetry and heart rate  Approach:  Midline  Location:  L3-L4  Injection Technique:  ANA saline  Skin infiltration:  Lidocaine  Strength:  1%  Dose:  3ml  Needle Type:  Tuohy  Needle Gauge:  17 G  Needle Length:  3.5 in  Loss of resistance::  6  Catheter Size:  19 G  Catheter at Skin Depth:  12  Test Dose Result:  Negative

## 2020-10-12 NOTE — CARE PLAN
Problem: Communication  Goal: The ability to communicate needs accurately and effectively will improve  Outcome: PROGRESSING AS EXPECTED     Problem: Safety  Goal: Will remain free from injury  Outcome: PROGRESSING AS EXPECTED  Goal: Will remain free from falls  Outcome: PROGRESSING AS EXPECTED     Problem: Infection  Goal: Will remain free from infection  Outcome: PROGRESSING AS EXPECTED  Note: G1 at 39-1, AROM at 0645, GBS neg, limiting vaginal exams, afebrile.      Problem: Venous Thromboembolism (VTW)/Deep Vein Thrombosis (DVT) Prevention:  Goal: Patient will participate in Venous Thrombosis (VTE)/Deep Vein Thrombosis (DVT)Prevention Measures  Outcome: PROGRESSING AS EXPECTED     Problem: Safety  Goal: Free from accidental injury  Outcome: PROGRESSING AS EXPECTED  Goal: Free from intentional harm  Outcome: PROGRESSING AS EXPECTED  Goal: Isolation Precautions for patient and staff safety  Outcome: PROGRESSING AS EXPECTED     Problem: Knowledge Deficit  Goal: Patient/Support person demonstrates understanding regarding the progression of labor, available options and participates in decision-making process  Outcome: PROGRESSING AS EXPECTED     Problem: Psychosocial needs  Goal: Spiritual needs incorporated in hospitalization  Outcome: PROGRESSING AS EXPECTED  Goal: Cultural needs incorporated in hospitalization  Outcome: PROGRESSING AS EXPECTED  Goal: Anxiety reduction  Outcome: PROGRESSING AS EXPECTED     Problem: Discharge Barriers/Planning  Goal: Patient's Continuum of care needs are met  Outcome: PROGRESSING AS EXPECTED     Problem: Pain  Goal: Alleviation of Pain or a reduction in pain to the patient's comfort goal  Outcome: PROGRESSING AS EXPECTED     Problem: Risk for Fluid Imbalance  Goal: Promotion of Fluid Balance  Outcome: PROGRESSING AS EXPECTED     Problem: Risk for Infection, Impaired Wound Healing  Goal: Remain free from signs and symptoms of infection  Outcome: PROGRESSING AS EXPECTED     Problem:  Risk for injury  Goal: Patient and fetus will be free of preventable injury/complications  Outcome: PROGRESSING AS EXPECTED  Goal: Fetus will be free of preventable trauma or other complications  Outcome: PROGRESSING AS EXPECTED  Note: Pitocin administered per protocol.  Category 1 and 2 tracing.

## 2020-10-13 LAB
ERYTHROCYTE [DISTWIDTH] IN BLOOD BY AUTOMATED COUNT: 43.9 FL (ref 35.9–50)
HCT VFR BLD AUTO: 34.6 % (ref 37–47)
HGB BLD-MCNC: 11.3 G/DL (ref 12–16)
MCH RBC QN AUTO: 29 PG (ref 27–33)
MCHC RBC AUTO-ENTMCNC: 32.7 G/DL (ref 33.6–35)
MCV RBC AUTO: 88.9 FL (ref 81.4–97.8)
PLATELET # BLD AUTO: 208 K/UL (ref 164–446)
PMV BLD AUTO: 9.3 FL (ref 9–12.9)
RBC # BLD AUTO: 3.89 M/UL (ref 4.2–5.4)
WBC # BLD AUTO: 14.8 K/UL (ref 4.8–10.8)

## 2020-10-13 PROCEDURE — 36415 COLL VENOUS BLD VENIPUNCTURE: CPT

## 2020-10-13 PROCEDURE — 85027 COMPLETE CBC AUTOMATED: CPT

## 2020-10-13 PROCEDURE — 700102 HCHG RX REV CODE 250 W/ 637 OVERRIDE(OP): Performed by: OBSTETRICS & GYNECOLOGY

## 2020-10-13 PROCEDURE — A9270 NON-COVERED ITEM OR SERVICE: HCPCS | Performed by: OBSTETRICS & GYNECOLOGY

## 2020-10-13 PROCEDURE — 770002 HCHG ROOM/CARE - OB PRIVATE (112)

## 2020-10-13 RX ORDER — IBUPROFEN 600 MG/1
600 TABLET ORAL EVERY 6 HOURS PRN
Status: DISCONTINUED | OUTPATIENT
Start: 2020-10-13 | End: 2020-10-14 | Stop reason: HOSPADM

## 2020-10-13 RX ADMIN — IBUPROFEN 600 MG: 600 TABLET, FILM COATED ORAL at 07:06

## 2020-10-13 RX ADMIN — IBUPROFEN 600 MG: 600 TABLET, FILM COATED ORAL at 17:50

## 2020-10-13 RX ADMIN — PRENATAL WITH FERROUS FUM AND FOLIC ACID 1 TABLET: 3080; 920; 120; 400; 22; 1.84; 3; 20; 10; 1; 12; 200; 27; 25; 2 TABLET ORAL at 10:09

## 2020-10-13 ASSESSMENT — PAIN DESCRIPTION - PAIN TYPE
TYPE: ACUTE PAIN

## 2020-10-13 ASSESSMENT — EDINBURGH POSTNATAL DEPRESSION SCALE (EPDS)
I HAVE BEEN ABLE TO LAUGH AND SEE THE FUNNY SIDE OF THINGS: AS MUCH AS I ALWAYS COULD
THINGS HAVE BEEN GETTING ON TOP OF ME: YES, SOMETIMES I HAVEN'T BEEN COPING AS WELL AS USUAL
THE THOUGHT OF HARMING MYSELF HAS OCCURRED TO ME: NEVER
I HAVE LOOKED FORWARD WITH ENJOYMENT TO THINGS: AS MUCH AS I EVER DID
I HAVE BLAMED MYSELF UNNECESSARILY WHEN THINGS WENT WRONG: YES, SOME OF THE TIME
I HAVE FELT SCARED OR PANICKY FOR NO GOOD REASON: NO, NOT MUCH
I HAVE BEEN SO UNHAPPY THAT I HAVE BEEN CRYING: NO, NEVER
I HAVE BEEN SO UNHAPPY THAT I HAVE HAD DIFFICULTY SLEEPING: NOT AT ALL
I HAVE BEEN ANXIOUS OR WORRIED FOR NO GOOD REASON: NO, NOT AT ALL
I HAVE FELT SAD OR MISERABLE: NO, NOT AT ALL

## 2020-10-13 ASSESSMENT — PATIENT HEALTH QUESTIONNAIRE - PHQ9
2. FEELING DOWN, DEPRESSED, IRRITABLE, OR HOPELESS: NOT AT ALL
1. LITTLE INTEREST OR PLEASURE IN DOING THINGS: NOT AT ALL
SUM OF ALL RESPONSES TO PHQ9 QUESTIONS 1 AND 2: 0

## 2020-10-13 NOTE — ANESTHESIA POSTPROCEDURE EVALUATION
Patient: Marlene Mejia    Procedure Summary     Date: 10/12/20 Room / Location:     Anesthesia Start: 0903 Anesthesia Stop: 1728    Procedure: Labor Epidural Diagnosis:     Scheduled Providers:  Responsible Provider: Micha Sanders M.D.    Anesthesia Type: epidural ASA Status: 2          Final Anesthesia Type: epidural  Last vitals  BP   Blood Pressure: 118/61    Temp   37.2 °C (99 °F)    Pulse   Pulse: 97   Resp        SpO2   97 %      Anesthesia Post Evaluation    Patient location during evaluation: floor  Patient participation: complete - patient participated  Level of consciousness: awake and alert  Pain score: 0    Airway patency: patent  Anesthetic complications: no  Cardiovascular status: hemodynamically stable  Respiratory status: acceptable  Hydration status: euvolemic    PONV: none    patient able to participate, but full recovery from regional anesthesia has not occurred and is not expected within the stipulated timeframe for the completion of the evaluation

## 2020-10-13 NOTE — L&D DELIVERY NOTE
"Vaginal Delivery Procedure Note:    Marlene Mejia is a 20 y.o. , now Para 1001     Weeks of gestation: 39w1d  Diagnosis: elective induction of labor, category 2 FHR, GBS neg    Patient completely dilated and +1 station. Variable decelerations with pushing, some down to the 70s, which had a good return to baseline and moderate variability in between contractions. Patient pushed over 1 hour. With last couple pushes deep variable decels down to the 60s occurred. Verbal consent obtained for vacuum assisted delivery. Vertex, LISA. Kiwi vacuum with palm pump obtained and applied to the vertex. Pressure applied to the vacuum at 550 mmHg. With patient pushing over a duration of one contraction gentle traction applied to the vacuum to deliver infant's head without difficulty. Outlet VAVD of viable female infant named \"Clifton\" over intact perineum at 1728. Once head delivered vacuum removed. No pop offs occurred. Loose nuchal cord x 1 present which was reduced over the infant's head. Anterior and posterior shoulders delivered with ease. Cord clamped and was then immediately cut, infant taken to warmer with RNs, RT, and NICU team.  Cord blood gases collected.  APGARs 7 and 9  Birth weight 6lb 15oz  Placenta delivered spontaneously intact at 1733. 3 Vessel cord.  Laceration: Left vaginal sulcus laceration with extension to left labia. 1% lidocaine injected for anesthesia. Repaired vaginal portion of laceration with 3-0 vicryl suture in usual running fashion. Then repaired labial portion with 4-0 vicryl suture running.  Postpartum bleeding due to uterine atony - treated with fundal massage, misoprostol 800mcg buccal, IM methergine, and pitocin.  Excellent hemostasis.  mL  Anesthesia - epidural and 1% lidocaine  Sponge and needle counts correct.  Patient tolerated procedure well. Mother and baby bonding skin to skin.    "

## 2020-10-13 NOTE — LACTATION NOTE
This note was copied from a baby's chart.         Mom is a 19 y/o P1 who delivered baby girl vaginally with vacuum assist. Baby has not fed since 530 and when  initially visited mom at 09, mom stated she wanted to eat first before feeding.  Mom has flat affect and is slow to respond if at all to LC 's education    Reviewed demand feeds with baby 10 or more times in 24 hours. Be aware that periods of cluster feeding are normal. Note rhythmic, effective jaw glide Reviewed normal  behavior and feeding patterns. Encouraged to do skin to skin during waking hours and feed when noting early feeding cues of licking lips, stirring in sleep  Offer both breast at every feeding and hand massage during feed to help with colostrum release.  Recommended that mom view [facility] video website. Hand express onto nipple before and after feedings. If baby not latching, hand express into spoon and feed back, however, mom should call RN for assist with delayed feeds from baby being sleepy.  Observe for voids and stools and document on feeding log. Noted stool's transitioning color    Demonstrated how to quickly draw baby to breast with gentle support at nape of neck and supporting breast in hand. Align baby's ear, shoulder and hip and place tummy to tummy across mom's belly with tip of nose gently touching breast.  Expressed colostrum/breast milk onto nipples prior to and after feeds to soothe nipples.       Provided handouts for outpatient breastfeeding support [place] and phone number for private lactation consults          Teaching Provided  Latch-on Techniques Taught: support nape of baby's head, gently sandwich the breast, nipple should rest above baby's upper lip, wait for wide gape and bring baby's head forward lower jaw first in scooping motion  Milk Making Process, Supply and Demand, and Emptying Taught: Demand feed baby 10 or more times in 24 hours. Be aware that periods of cluster feeding are normal. Note  rhythmic, effective jaw glide, offer both breastat each feed, keep baby awake  Positioning Techniques Taught: Sit upright in bed with pillow support at back. Align baby's ear, shoulder and hip across mother's belly tummy to tummy or side for football hold. Nose should be across from nipple. Mom with relaxed shoulders so baby is supported by pillows and nose is only gently touching breast.  Recognizing Feeding Cues Taught: hands to mouth , satirirng in sleep. encourgaed skin ot skin with baby to observe cues early    Follow up in morning with lactation support.

## 2020-10-13 NOTE — PROGRESS NOTES
S: Pt doing well, lochia small, states having pain in vaginal area - sharp, cramping, has not taken anything for pain, voiding, passing gas, breast feeding but pt states she is having issues, feels baby is having a lot of gas and not eating well    O:   Vitals:    10/13/20 0525   BP: 126/78   Pulse: 90   Resp: 18   Temp: 36.5 °C (97.7 °F)   SpO2: 97%     Gen - NAD, comfortable  Abd - soft, nontender, nondistended, no rebound or guarding  Fundus - firm, nontender, below umbilicus  Ext - nontender, 1+ BLE edema  Pelvic - mild edema and ecchymosis of left labia majora, no palpable hematoma, sutures intact, healing well    Labs: Hgb 11.3    A: PPD#1 s/p  at 39wks  Breast feeding    P: Continue routine postpartum care, patient will work with lactation today for breast feeding, pain meds encouraged, caroline care educated in detail - ice packs, tucks, dermaplast, patient still needs assistance from nursing on self/infant care, d/c home on PPD#2

## 2020-10-13 NOTE — PROGRESS NOTES
0700-- Received report from MARIANNE Min, Infant at bedside in open crib no signs of distress.  Pt resting in bed. Discussed pain management for the day.  No further needs at the time.  Call light within reach, bed locked and in lowest position.  Rounding in place.    0840-- Assessment completed, VSS, Pt declines PRN pain medication at this time.  Discussed plan of care for the day that pt is comfortable with.  All questions answered at this time.  Will continue to monitor.

## 2020-10-13 NOTE — PROGRESS NOTES
Pt received to room 355 from L&D via wheelchair, transferred to bed without difficulty, reports she was able to void in L&D prior to transfer. Fundus firm, lochia light without clots expressed, pad observed. LR with pitocin infusing at 125ml/hr. Bedside report received from L&D RN. Pt and SO oriented to room, unit, plan of care, call light, bed controls, safety and security, questions answered, pt verbalizes understanding of instructions. Continuing to monitor. Mechelle Gale R.N.

## 2020-10-13 NOTE — ANESTHESIA QCDR
2019 [facility] Clinical Data Registry (for Quality Improvement)     Postoperative nausea/vomiting risk protocol (Adult = 18 yrs and Pediatric 3-17 yrs)- (430 and 463)  General inhalation anesthetic (NOT TIVA) with PONV risk factors: No  Provision of anti-emetic therapy with at least 2 different classes of agents: N/A  Patient DID NOT receive anti-emetic therapy and reason is documented in Medical Record: N/A    Multimodal Pain Management- (477)  Non-emergent surgery AND patient age >= 18:   Use of Multimodal Pain Management, two or more drugs and/or interventions, NOT including systemic opioids:   Exception: Documented allergy to multiple classes of analgesics:     Smoking Abstinence (404)  Patient is current smoker (cigarette, pipe, e-cig, marijuanna):   Elective Surgery:   Abstinence instructions provided prior to day of surgery:   Patient abstained from smoking on day of surgery:     Pre-Op Beta-Blocker in Isolated CABG (44)  Isolated CABG AND patient age >= 18:   Beta-blocker admin within 24 hours of surgical incision:   Exception:of medical reason(s) for not administering beta blocker within 24 hours prior to surgical incision (e.g., not  indicated,other medical reason):     PACU assessment of acute postoperative pain prior to Anesthesia Care End- Applies to Patients Age = 18- (ABG7)  Initial PACU pain score is which of the following: < 7/10  Patient unable to report pain score: N/A    Post-anesthetic transfer of care checklist/protocol to PACU/ICU- (426 and 427)  Upon conclusion of case, patient transferred to which of the following locations: PACU/Non-ICU  Use of transfer checklist/protocol: Yes  Exclusion: Service Performed in Patient Hospital Room (and thus did not require transfer): N/A  Unplanned admission to ICU related to anesthesia service up through end of PACU care- (MD51)  Unplanned admission to ICU (not initially anticipated at anesthesia start time): No

## 2020-10-13 NOTE — ANESTHESIA TIME REPORT
Anesthesia Start and Stop Event Times     Date Time Event    10/12/2020 0902 Ready for Procedure     0903 Anesthesia Start     1728 Anesthesia Stop        Responsible Staff  10/12/20    Name Role Begin End    Micha Sanders M.D. Anesth 0903 1728        Preop Diagnosis (Free Text):  Pre-op Diagnosis             Preop Diagnosis (Codes):    Post op Diagnosis  Pregnant      Premium Reason  A. 3PM - 7AM    Comments:

## 2020-10-13 NOTE — CARE PLAN
Problem: Communication  Goal: The ability to communicate needs accurately and effectively will improve  Outcome: PROGRESSING AS EXPECTED  Intervention: [place] patient and significant other/support system to call light to alert staff of needs  Flowsheets (Taken 10/12/2020 6516)  Oriented to::   All of the Following : Location of Bathroom, Visiting Policy, Unit Routine, Call Light and Bedside Controls, Bedside Rail Policy, Smoking Policy, Rights and Responsibilities, Bedside Report, and Patient Education Notebook   Unit Routine   Bedside Report   Location of bathroom   Call Light & Bedside Controls   Patient Rights and Responsibilities   Visiting Policy   Bedside Rail Policy   Patient Education Notebook  Note: Pt and SO oriented to room, unit, plan of care, call light system, bed controls, safety and security, and visitor restrictions, questions answered     Problem: Fluid Volume:  Goal: Will maintain balanced intake and output  Outcome: PROGRESSING AS EXPECTED  Note: Adequate po intake, voiding without difficulty

## 2020-10-13 NOTE — PROGRESS NOTES
1900 Bedside report received from GARY Navarrete RN, POC discussed, assumed care.    1935 Pt up to restroom to void. Tolerated well. Pericare provided. Pt transferred to PPU with baby in arms, both in stable condition. Bedside report given to [facility] MARIANNE. Assumed care.

## 2020-10-14 VITALS
WEIGHT: 212 LBS | RESPIRATION RATE: 18 BRPM | DIASTOLIC BLOOD PRESSURE: 67 MMHG | HEIGHT: 64 IN | OXYGEN SATURATION: 93 % | TEMPERATURE: 97.8 F | SYSTOLIC BLOOD PRESSURE: 103 MMHG | HEART RATE: 84 BPM | BODY MASS INDEX: 36.19 KG/M2

## 2020-10-14 PROCEDURE — 700102 HCHG RX REV CODE 250 W/ 637 OVERRIDE(OP): Performed by: OBSTETRICS & GYNECOLOGY

## 2020-10-14 PROCEDURE — A9270 NON-COVERED ITEM OR SERVICE: HCPCS | Performed by: OBSTETRICS & GYNECOLOGY

## 2020-10-14 RX ADMIN — IBUPROFEN 600 MG: 600 TABLET, FILM COATED ORAL at 00:07

## 2020-10-14 ASSESSMENT — PAIN DESCRIPTION - PAIN TYPE
TYPE: ACUTE PAIN

## 2020-10-14 NOTE — CARE PLAN
Problem: Altered physiologic condition related to immediate post-delivery state and potential for bleeding/hemorrhage  Goal: Patient physiologically stable as evidenced by normal lochia, palpable uterine involution and vital signs within normal limits  Outcome: PROGRESSING AS EXPECTED  Intervention: Massage fundus as necessary to prevent excessive lochia  Note: VSS. Fundus firm. Lochia light.      Problem: Potential anxiety related to difficulty adapting to parental role  Goal: Patient will verbalize and demonstrate effective bonding and parenting behavior  Outcome: PROGRESSING SLOWER THAN EXPECTED  Intervention: Provide emotional support and encouragement to patient and family  Note: Patient calling nurse frequently throughout shift for crying baby. Emotional support and education provided.

## 2020-10-14 NOTE — LACTATION NOTE
This note was copied from a baby's chart.  Mom having difficulty late last and this morning latching baby. Peds visited mother and wanted lactation to assist and requested a weight to be done on baby this morning and for possible supplementation according to mom's wishes and states baby is fussy.  Upon LC visit mom was asked her desire for feeding baby . She stated she would like to go back and forth with bottle and breast. LC discussed supply and demand and keeping baby at breast before offering supplement. Baby has been using a pacifier and is pushing away from breast, yet rooting and extending tongue. Mom is getting frustrated. She expresses colostrum onto nipples and baby continues to jesus and sweep without latching. LC swaddled baby to calm, gave soothie pacifier and when baby was in a rhythmic suckle pattern, removed pacifier and baby was able to latch for 7-10 minutes, pulling of only in beginning until colostrum was released. LC reviewed feeding patterns and plan for discharge which includes offering small supplement and pumping to drive milk supply. Mom and FOB will call [facility] on Friday if they have not been contacted by liaison and will obtain an electric pump as soon as possible.   Parents will follow up with peds in the office tomorrow a.m  Baby's weight was down 7.2 % this morning.    Provided handouts for outpatient breastfeeding support [place] and phone number for private lactation consults                  Encouraged parents to follow up with pediatrician as instructed. If any concerns arise regarding feedings, jaundice levels, decreased output, or  consistently poor feedings, call baby's doctor.

## 2020-10-14 NOTE — PROGRESS NOTES
Discharge instructions reviewed. Pt verbalized understanding of when to follow up. Denies further questions. Bands checked. Car seat checked. Pt discharged.

## 2020-10-14 NOTE — PROGRESS NOTES
0700 Received bedside report from MARIANNE Jones. Discussed plan of care. Patient will call for pain medication as needed. All needs met at this time.

## 2020-10-14 NOTE — DISCHARGE INSTRUCTIONS
POSTPARTUM DISCHARGE INSTRUCTIONS FOR MOM    YOB: 2000   Age: 20 y.o.               Admit Date: 10/11/2020     Discharge Date: 10/14/2020  Attending Doctor:  Angie Ruiz M.D.                  Allergies:  Pollen extract    Discharged to home by car. Discharged via wheelchair, hospital escort: Yes.  Special equipment needed: Not Applicable  Belongings with: Personal  Be sure to schedule a follow-up appointment with your primary care doctor or any specialists as instructed.     Discharge Plan:   Diet Plan: Discussed  Activity Level: Discussed  Confirmed Follow up Appointment: Patient to Call and Schedule Appointment  Confirmed Symptoms Management: Discussed  Medication Reconciliation Updated: Yes  Influenza Vaccine Indication: Not indicated: Previously immunized this influenza season and > 8 years of age    REASONS TO CALL YOUR OBSTETRICIAN:  1.   Persistent fever or shaking chills (Temperature higher than 100.4)  2.   Heavy bleeding (soaking more than 1 pad per hour); Passing clots  3.   Foul odor from vagina  4.   Mastitis (Breast infection; breast pain, chills, fever, redness)  5.   Urinary pain, burning or frequency  6.   Episiotomy infection  7.   Abdominal incision infection  8.   Severe depression longer than 24 hours    HAND WASHING  · Prior to handling the baby.  · Before breastfeeding or bottle feeding baby.  · After using the bathroom or changing the baby's diaper.    WOUND CARE  Ask your physician for additional care instructions.  In general:        VAGINAL CARE  · Nothing inside vagina for 6 weeks: no sexual intercourse, tampons or douching.  · Bleeding may continue for 2-4 weeks.  Amount may vary.    · Call your physician for heavy bleeding which means soaking more than 1 pad per hour    BIRTH CONTROL  · It is possible to become pregnant at any time after delivery and while breastfeeding.  · Plan to discuss a method of birth control with your physician at your follow up visit.  "visit.    DIET AND ELIMINATION  · Eating more fiber (bran cereal, fruits, and vegetables) and drinking plenty of fluids will help to avoid constipation.  · Urinary frequency after childbirth is normal.    POSTPARTUM BLUES  During the first few days after birth, you may experience a sense of the \"blues\" which may include impatience, irritability or even crying.  These feeling come and go quickly.  However, as many as 1 in 10 women experience emotional symptoms known as postpartum depression.    Postpartum depression:  May start as early as the second or third day after delivery or take several weeks or months to develop.  Symptoms of \"blues\" are present, but are more intense:  Crying spells; loss of appetite; feelings of hopelessness or loss of control; fear of touching the baby; over concern or no concern at all about the baby; little or no concern about your own appearance/caring for yourself; and/or inability to sleep or excessive sleeping.  Contact your physician if you are experiencing any of these symptoms.    Crisis Hotline:  · [place] Crisis Hotline:  0-383-EIJORLD  Or 1-267.887.8046  · [place] Crisis Hotline:  1-879.286.5738  Or 389-393-9745    PREVENTING SHAKEN BABY:  If you are angry or stressed, PUT THE BABY IN THE CRIB, step away, take some deep breaths, and wait until you are calm to care for the baby.  DO NOT SHAKE THE BABY.  You are not alone, call a supporter for help.    · Crisis Call Center 24/7 crisis line 696-798-3335 or 1-941.635.8399  · You can also text them, text \"ANSWER\" to 289117    QUIT SMOKING/TOBACCO USE:  I understand the use of any tobacco products increases my chance of suffering from future heart disease and could cause other illnesses which may shorten my life. Quitting the use of tobacco products is the single most important thing I can do to improve my health. For further information on smoking / tobacco cessation call a Toll Free Quit Line at 1-950.290.1892 (*National Cancer " [place]) or 1-522.712.2441 (American Lung Association) or you can access the web based program at www.lungusa.org.    · [place] Tobacco Users Help Line:  (160) 741-9672       Toll Free: 1-799.224.7050  · Quit Tobacco Program [facility] (008)577-2476    DEPRESSION / SUICIDE RISK:  As [facility] are [facility], it is important to learn how to keep safe from harming yourself.    Recognize the warning signs:  · Abrupt changes in personality, positive or negative- including increase in energy   · Giving away possessions  · Change in eating patterns- significant weight changes-  positive or negative  · Change in sleeping patterns- unable to sleep or sleeping all the time   · Unwillingness or inability to communicate  · Depression  · Unusual sadness, discouragement and loneliness  · Talk of wanting to die  · Neglect of personal appearance   · Rebelliousness- reckless behavior  · Withdrawal from people/activities they love  · Confusion- inability to concentrate     If you or a loved one observes any of these behaviors or has concerns about self-harm, here's what you can do:  · Talk about it- your feelings and reasons for harming yourself  · Remove any means that you might use to hurt yourself (examples: pills, rope, extension cords, firearm)  · Get professional help from the community (Mental Health, Substance Abuse, psychological counseling)  · Do not be alone:Call your Safe Contact- someone whom you trust who will be there for you.  · Call your local CRISIS HOTLINE 685-2961 or 721-738-9323  · Call your local Children's Mobile Crisis Response Team [place] (743) 846-3067 or www.[employer]  · Call the toll free National Suicide Prevention Hotlines   · National Suicide Prevention Lifeline 325-740-YRHM (3586)  · National Hope Line Network 800-SUICIDE (281-5732)    DISCHARGE SURVEY:  Thank you for choosing [facility].  We hope we provided you with very good care.   You may be receiving a survey in the mail.  Please fill it out.  Your opinion is valuable to us.    ADDITIONAL EDUCATIONAL MATERIALS GIVEN TO PATIENT:        My signature on this form indicates that:  1.  I have reviewed and understand the above information  2.  My questions regarding this information have been answered to my satisfaction.  3.  I have formulated a plan with my discharge nurse to obtain my prescribed medication for home.

## 2020-10-14 NOTE — PROGRESS NOTES
Patient educated on plan of care, including, safety, mobility, pain management, and medication administration. Patient asked to call for help with next breastfeed. Patient requesting to call for pain medication when needed.Call light within reach.

## 2020-10-14 NOTE — DISCHARGE SUMMARY
Discharge Summary:      Marlene Mejia    Admit Date:   10/11/2020  Discharge Date:  10/14/2020     Admitting diagnosis:  Pregnancy  Labor and delivery indication for care or intervention  Discharge Diagnosis: Status post vaginal, spontaneous.  Pregnancy Complications: none  Tubal Ligation:  no        History:  Past Medical History:   Diagnosis Date   • ASTHMA      OB History    Para Term  AB Living   1             SAB TAB Ectopic Molar Multiple Live Births                    # Outcome Date GA Lbr Leonel/2nd Weight Sex Delivery Anes PTL Lv   1 Current                 Pollen extract  There are no active problems to display for this patient.       Hospital Course:   20 y.o. , now para 1, was admitted with the above mentioned diagnosis, underwent Induction of Labor, vaginal, spontaneous. Patient postpartum course was unremarkable, with progressive advancement in diet , ambulation and toleration of oral analgesia. Patient without complaints today and desires discharge.      Vitals:    10/13/20 0525 10/13/20 1000 10/13/20 1800 10/14/20 0600   BP: 126/78 109/67 127/75 103/67   Pulse: 90 91 91 84   Resp: 18 16 18 18   Temp: 36.5 °C (97.7 °F) 36.4 °C (97.6 °F) 36.7 °C (98 °F) 36.6 °C (97.8 °F)   TempSrc: Temporal Temporal Temporal Temporal   SpO2: 97% 93% 100% 93%   Weight:       Height:           Current Facility-Administered Medications   Medication Dose   • ibuprofen (MOTRIN) tablet 600 mg  600 mg   • ondansetron (ZOFRAN ODT) dispertab 4 mg  4 mg    Or   • ondansetron (ZOFRAN) syringe/vial injection 4 mg  4 mg   • oxytocin (PITOCIN) infusion (for postpartum)   mL/hr   • oxyCODONE-acetaminophen (PERCOCET) 5-325 MG per tablet 1 Tab  1 Tab   • oxyCODONE-acetaminophen (PERCOCET) 5-325 MG per tablet 2 Tab  2 Tab   • LR infusion     • PRN oxytocin (PITOCIN) (20 Units/1000 mL) PRN for excessive uterine bleeding - See Admin Instr  125-999 mL/hr   • miSOPROStol (CYTOTEC) tablet 600 mcg  600 mcg   •  docusate sodium (COLACE) capsule 100 mg  100 mg   • magnesium hydroxide (MILK OF MAGNESIA) suspension 30 mL  30 mL   • tetanus-dipth-acell pertussis (Tdap) inj 0.5 mL  0.5 mL   • measles, mumps and rubella vaccine (MMR) injection 0.5 mL  0.5 mL   • prenatal plus vitamin (STUARTNATAL 1+1) 27-1 MG tablet 1 Tab  1 Tab   • miSOPROStol (CYTOTEC) tablet 25 mcg  25 mcg       Exam:  Breast Exam: negative  Abdomen: Abdomen soft, non-tender. BS normal. No masses,  No organomegaly  Fundus Non Tender: yes  Incision: none  Perineum: perineum intact  Extremity: extremities, peripheral pulses and reflexes normal     Labs:  Recent Labs     10/11/20  1456 10/13/20  0546   WBC 7.8 14.8*   RBC 4.23 3.89*   HEMOGLOBIN 12.3 11.3*   HEMATOCRIT 36.6* 34.6*   MCV 86.5 88.9   MCH 29.1 29.0   MCHC 33.6 32.7*   RDW 42.3 43.9   PLATELETCT 285 208   MPV 9.9 9.3        Activity:   Discharge to home  Pelvic Rest x 6 weeks    Assessment:  normal postpartum course  Discharge Assessment: Taking adequate diet and fluids; meeting discharge criteria     Follow up: 6 weeks.      Discharge Meds:   No current outpatient medications on file.       Karina Zheng M.D.

## 2021-06-12 ENCOUNTER — HOSPITAL ENCOUNTER (EMERGENCY)
Facility: MEDICAL CENTER | Age: 21
End: 2021-06-13
Payer: MEDICAID

## 2021-06-12 LAB
ALBUMIN SERPL BCP-MCNC: 4.6 G/DL (ref 3.2–4.9)
ALBUMIN/GLOB SERPL: 1.4 G/DL
ALP SERPL-CCNC: 98 U/L (ref 30–99)
ALT SERPL-CCNC: 21 U/L (ref 2–50)
ANION GAP SERPL CALC-SCNC: 12 MMOL/L (ref 7–16)
AST SERPL-CCNC: 24 U/L (ref 12–45)
BASOPHILS # BLD AUTO: 0.5 % (ref 0–1.8)
BASOPHILS # BLD: 0.02 K/UL (ref 0–0.12)
BILIRUB SERPL-MCNC: <0.2 MG/DL (ref 0.1–1.5)
BUN SERPL-MCNC: 8 MG/DL (ref 8–22)
CALCIUM SERPL-MCNC: 8.9 MG/DL (ref 8.5–10.5)
CHLORIDE SERPL-SCNC: 105 MMOL/L (ref 96–112)
CO2 SERPL-SCNC: 23 MMOL/L (ref 20–33)
CREAT SERPL-MCNC: 0.74 MG/DL (ref 0.5–1.4)
EKG IMPRESSION: NORMAL
EOSINOPHIL # BLD AUTO: 0.13 K/UL (ref 0–0.51)
EOSINOPHIL NFR BLD: 3.5 % (ref 0–6.9)
ERYTHROCYTE [DISTWIDTH] IN BLOOD BY AUTOMATED COUNT: 39.7 FL (ref 35.9–50)
GLOBULIN SER CALC-MCNC: 3.3 G/DL (ref 1.9–3.5)
GLUCOSE SERPL-MCNC: 100 MG/DL (ref 65–99)
HCT VFR BLD AUTO: 45.2 % (ref 37–47)
HGB BLD-MCNC: 15.8 G/DL (ref 12–16)
IMM GRANULOCYTES # BLD AUTO: 0 K/UL (ref 0–0.11)
IMM GRANULOCYTES NFR BLD AUTO: 0 % (ref 0–0.9)
LYMPHOCYTES # BLD AUTO: 2.1 K/UL (ref 1–4.8)
LYMPHOCYTES NFR BLD: 57.1 % (ref 22–41)
MCH RBC QN AUTO: 30.2 PG (ref 27–33)
MCHC RBC AUTO-ENTMCNC: 35 G/DL (ref 33.6–35)
MCV RBC AUTO: 86.3 FL (ref 81.4–97.8)
MONOCYTES # BLD AUTO: 0.41 K/UL (ref 0–0.85)
MONOCYTES NFR BLD AUTO: 11.1 % (ref 0–13.4)
NEUTROPHILS # BLD AUTO: 1.02 K/UL (ref 2–7.15)
NEUTROPHILS NFR BLD: 27.8 % (ref 44–72)
NRBC # BLD AUTO: 0 K/UL
NRBC BLD-RTO: 0 /100 WBC
PLATELET # BLD AUTO: 261 K/UL (ref 164–446)
PMV BLD AUTO: 8.7 FL (ref 9–12.9)
POTASSIUM SERPL-SCNC: 4 MMOL/L (ref 3.6–5.5)
PROT SERPL-MCNC: 7.9 G/DL (ref 6–8.2)
RBC # BLD AUTO: 5.24 M/UL (ref 4.2–5.4)
SODIUM SERPL-SCNC: 140 MMOL/L (ref 135–145)
TROPONIN T SERPL-MCNC: <6 NG/L (ref 6–19)
WBC # BLD AUTO: 3.7 K/UL (ref 4.8–10.8)

## 2021-06-12 PROCEDURE — 36415 COLL VENOUS BLD VENIPUNCTURE: CPT

## 2021-06-12 PROCEDURE — 85025 COMPLETE CBC W/AUTO DIFF WBC: CPT

## 2021-06-12 PROCEDURE — 80053 COMPREHEN METABOLIC PANEL: CPT

## 2021-06-12 PROCEDURE — 93005 ELECTROCARDIOGRAM TRACING: CPT

## 2021-06-12 PROCEDURE — 302449 STATCHG TRIAGE ONLY (STATISTIC)

## 2021-06-12 PROCEDURE — 84484 ASSAY OF TROPONIN QUANT: CPT

## 2021-06-12 ASSESSMENT — FIBROSIS 4 INDEX: FIB4 SCORE: 0.3

## 2021-06-12 ASSESSMENT — PAIN DESCRIPTION - DESCRIPTORS: DESCRIPTORS: PRESSURE

## 2021-06-13 VITALS
TEMPERATURE: 97.4 F | HEART RATE: 82 BPM | OXYGEN SATURATION: 97 % | RESPIRATION RATE: 16 BRPM | SYSTOLIC BLOOD PRESSURE: 105 MMHG | DIASTOLIC BLOOD PRESSURE: 71 MMHG | HEIGHT: 64 IN | BODY MASS INDEX: 33.01 KG/M2 | WEIGHT: 193.34 LBS

## 2021-06-13 NOTE — ED TRIAGE NOTES
Chief Complaint   Patient presents with   • Chest Pain     pain starts LUQ radiated to center of chest, pain started two days ago      Pt ambulatory to triage for above complaint. Pt states her LUQ pain started two days ago and radiates to her center chest, pt states the pain used to come and go and now the pain is constant, pt has been having nausea but no emesis, denies diarrhea.       Pt is alert/oriented and follows commands. Pt speaking in full sentences and responds appropriately to questions. No acute distress noted in triage and respirations are even and unlabored.     Pt placed in lobby and educated on triage process. Pt encouraged to alert staff for any changes in condition.